# Patient Record
Sex: FEMALE | Race: WHITE | NOT HISPANIC OR LATINO | Employment: OTHER | URBAN - METROPOLITAN AREA
[De-identification: names, ages, dates, MRNs, and addresses within clinical notes are randomized per-mention and may not be internally consistent; named-entity substitution may affect disease eponyms.]

---

## 2024-08-19 ENCOUNTER — APPOINTMENT (OUTPATIENT)
Dept: RADIOLOGY | Facility: HOSPITAL | Age: 59
End: 2024-08-19
Payer: MEDICARE

## 2024-08-19 ENCOUNTER — HOSPITAL ENCOUNTER (EMERGENCY)
Facility: HOSPITAL | Age: 59
Discharge: HOME | End: 2024-08-19
Payer: MEDICARE

## 2024-08-19 VITALS
WEIGHT: 89 LBS | OXYGEN SATURATION: 97 % | TEMPERATURE: 97.7 F | HEIGHT: 61 IN | SYSTOLIC BLOOD PRESSURE: 134 MMHG | RESPIRATION RATE: 18 BRPM | BODY MASS INDEX: 16.8 KG/M2 | HEART RATE: 74 BPM | DIASTOLIC BLOOD PRESSURE: 70 MMHG

## 2024-08-19 DIAGNOSIS — S89.92XA INJURY OF KNEE, LEFT, INITIAL ENCOUNTER: ICD-10-CM
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
## 2024-08-19 DIAGNOSIS — S89.91XA INJURY OF RIGHT KNEE, INITIAL ENCOUNTER: ICD-10-CM

## 2024-08-19 PROCEDURE — 90471 IMMUNIZATION ADMIN: CPT

## 2024-08-19 PROCEDURE — 99283 EMERGENCY DEPT VISIT LOW MDM: CPT | Mod: 25

## 2024-08-19 PROCEDURE — 73564 X-RAY EXAM KNEE 4 OR MORE: CPT | Mod: BILATERAL PROCEDURE | Performed by: RADIOLOGY

## 2024-08-19 PROCEDURE — 90715 TDAP VACCINE 7 YRS/> IM: CPT

## 2024-08-19 PROCEDURE — 96372 THER/PROPH/DIAG INJ SC/IM: CPT

## 2024-08-19 PROCEDURE — 73564 X-RAY EXAM KNEE 4 OR MORE: CPT | Mod: 50

## 2024-08-19 PROCEDURE — 2500000004 HC RX 250 GENERAL PHARMACY W/ HCPCS (ALT 636 FOR OP/ED)

## 2024-08-19 RX ORDER — NAPROXEN 500 MG/1
500 TABLET ORAL
Qty: 20 TABLET | Refills: 0 | Status: SHIPPED | OUTPATIENT
Start: 2024-08-19 | End: 2024-08-29

## 2024-08-19 RX ORDER — NAPROXEN 500 MG/1
500 TABLET ORAL
Qty: 20 TABLET | Refills: 0 | Status: SHIPPED | OUTPATIENT
Start: 2024-08-19 | End: 2024-08-19

## 2024-08-19 RX ORDER — KETOROLAC TROMETHAMINE 30 MG/ML
15 INJECTION, SOLUTION INTRAMUSCULAR; INTRAVENOUS ONCE
Status: COMPLETED | OUTPATIENT
Start: 2024-08-19 | End: 2024-08-19

## 2024-08-19 ASSESSMENT — PAIN - FUNCTIONAL ASSESSMENT: PAIN_FUNCTIONAL_ASSESSMENT: 0-10

## 2024-08-19 ASSESSMENT — LIFESTYLE VARIABLES
EVER FELT BAD OR GUILTY ABOUT YOUR DRINKING: NO
EVER HAD A DRINK FIRST THING IN THE MORNING TO STEADY YOUR NERVES TO GET RID OF A HANGOVER: NO
HAVE PEOPLE ANNOYED YOU BY CRITICIZING YOUR DRINKING: NO
TOTAL SCORE: 0
HAVE YOU EVER FELT YOU SHOULD CUT DOWN ON YOUR DRINKING: NO

## 2024-08-19 ASSESSMENT — COLUMBIA-SUICIDE SEVERITY RATING SCALE - C-SSRS
6. HAVE YOU EVER DONE ANYTHING, STARTED TO DO ANYTHING, OR PREPARED TO DO ANYTHING TO END YOUR LIFE?: NO
2. HAVE YOU ACTUALLY HAD ANY THOUGHTS OF KILLING YOURSELF?: NO
1. IN THE PAST MONTH, HAVE YOU WISHED YOU WERE DEAD OR WISHED YOU COULD GO TO SLEEP AND NOT WAKE UP?: NO

## 2024-08-19 ASSESSMENT — PAIN SCALES - GENERAL: PAINLEVEL_OUTOF10: 5 - MODERATE PAIN

## 2024-08-19 NOTE — DISCHARGE INSTRUCTIONS
You are seen emergency room today for a bilateral knee injury.  X-rays were performed did not show any broken bones.  At this time, you will be discharged home and recommended supportive care for your injuries including rest, ice/heat, Tylenol/ibuprofen.  I did provide you with a referral to orthopedic surgery for further evaluation.  In some cases, you may require an MRI for further evaluation of the injury.  Unfortunately, x-ray imaging does not show us any ligament or tendon injury, but an MRI can.  MRIs are not performed emergently in the emergency department for your specific injury.  Please also follow with your primary care provider regarding her visit to the ED today.  Reasons to return include worsening knee pain, difficulty with ambulation, chest pain, and shortness of breath.

## 2024-08-19 NOTE — ED PROVIDER NOTES
HPI   Chief Complaint   Patient presents with    Fall       Jen is a 58-year-old female with no pertinent past medical history presenting the emergency department with bilateral knee pain.  Patient states that on Friday, 8/16/2024, she went to [facility] [place] where she was walking and tripped over a curb.  Patient states that she landed on her knees bilaterally.  Since the incident, patient has been able to ambulate, but the pain has become progressively worse over the past couple days.  She denies any prior injury or fracture to the knees.  No history of knee replacement.  Patient states that the left knee is more painful than the right.  Patient denies any numbness, tingling, weakness.  No fever or chills.  Patient does have abrasions to the bilateral knees and is unsure when her last tetanus was.               Patient History   No past medical history on file.  No past surgical history on file.  No family history on file.  Social History     Tobacco Use    Smoking status: Not on file    Smokeless tobacco: Not on file   Substance Use Topics    Alcohol use: Not on file    Drug use: Not on file       Physical Exam   ED Triage Vitals [08/19/24 1658]   Temperature Heart Rate Respirations BP   36.5 °C (97.7 °F) 74 18 134/70      Pulse Ox Temp src Heart Rate Source Patient Position   97 % -- -- --      BP Location FiO2 (%)     -- --       Physical Exam  HENT:      Mouth/Throat:      Mouth: Mucous membranes are moist.      Pharynx: Oropharynx is clear.   Eyes:      Extraocular Movements: Extraocular movements intact.   Cardiovascular:      Rate and Rhythm: Normal rate and regular rhythm.      Pulses: Normal pulses.      Heart sounds: Normal heart sounds.   Pulmonary:      Effort: Pulmonary effort is normal.      Breath sounds: Normal breath sounds.   Abdominal:      General: Abdomen is flat.      Palpations: Abdomen is soft.   Musculoskeletal:         General: Tenderness and signs of injury present. No swelling or  deformity.      Cervical back: Normal range of motion. No rigidity.      Comments: Patient with scabbed abrasions to the anterior knees bilaterally.  No obvious soft tissue swelling or deformity.  Tenderness palpation of the bilateral knees with the left worse than the right.  No tenderness palpation of the proximal tib/fib, thigh, or ankles.  Patient has intact passive and active range of motion.  Intact strength and sensation with pulses strong distally.    Neurological:      Mental Status: She is alert and oriented to person, place, and time.           ED Course & MDM   ED Course as of 08/20/24 0136   Mon Aug 19, 2024   1901 XR knee 4+ views bilateral  IMPRESSION:  No acute bony abnormality demonstrated..   [AH]      ED Course User Index  [AH] Michelle Kowalski PA-C         Diagnoses as of 08/20/24 0136   Fall, initial encounter   Injury of knee, left, initial encounter   Injury of right knee, initial encounter                 No data recorded     Abdullahi Coma Scale Score: 15 (08/19/24 1658 : Immanuel Mcdonnell, EMT)                           Medical Decision Making  Jen is a 58-year-old female with no pertinent past medical history presenting the emergency department with bilateral knee pain.     Medical Decision Making: She did not appear ill or toxic.  Vital signs reviewed and stable.  Workup included x-ray of the bilateral knees.  There is no acute bony abnormality demonstrated on the bilateral x-rays.  In the emergency room, patient received IM Toradol for pain control and was up-to-date on her tetanus for the abrasion sustained from the fall.  Patient was able to ambulate.  At this time, she be discharged home and recommended follow with a primary care provider.  I did provide her with referral to orthopedic surgery for further management of her injuries and potential MRI to rule out ligamentous/tendinous injury.  Patient to return the emergency room if develops any worsening pain, difficulty with  ambulation, complete numbness/tingling.  She was agreeable to plan and all question concerns were addressed.     Differential diagnoses considered: Fracture, dislocation, ligamentous tendinous injury, etc.     Medications given: Toradol, Boostrix      Diagnosis: Fall, injury of right and left knee    Plan: Discharge          Procedure  Procedures     Michelle Kowalski PA-C  08/23/24 9448

## 2025-03-23 NOTE — PROGRESS NOTES
History of Present Complaint:  The patient was referred to us by Referring Provider: ***. this is 59 y.o.  female {Accompanied by:[id number]}with a past history of {kt past medical history:90142} presenting with {kt mrdica symptoms:63109}    Pain started {pain onset:39411}  Pain is {Better or worse:52062}   Patient history significant for the following red flags: {Red flags:56658}  The pain is described as {Pain description:42332} and is relieved by {pain relief:00983}      Prior Pain Therapies: {Prior pain therapy:23364}    Past surgical history:  {Past surgical history:70784}           Procedures:   *** the patient has had a ***% improvement in pain.    Portions of record reviewed for pertinent issues: active problem list, medication list, allergies, family history, social history, notes from last encounter, encounters, lab results, imaging and other available records.    I have personally reviewed the OARRS report for this patient. This report is scanned into the electronic medical record. I have considered the risks of abuse, dependence, addiction and diversion. It showed: ***  OPIOID RISK ASSESSMENT SCORE ***/26  Opioid agreement: ***  Activities of daily living: ***  Adverse effects: ***  Analgesia: W/O ***/10, W ***/10  {***}  Toxicology screen: ***  Aberrant behavior: ***  My patient has no underlying substance abuse or alcohol abuse and there's no mental health conditions contributing to the patient's pain.        Diagnostic studies:  ***      Employment/disability/litigation: {ktlitigation:45267}    Social History:  {KT social history:11968}       Review of Systems       Physical Exam       Assessment  ***           Plan  At least 50% of the visit was involved in the discussion of the options for treatment. We discussed exercises, medication, interventional therapies and surgery. Healthy life style is essential with patient hard work to achieve the wellness. In addition; discussion with the patient and/or  family about any of the diagnostic results, impressions and/or recommended diagnostic studies, prognosis, risks and benefits of treatment options, instructions for treatment and/or follow-up, importance of compliance with chosen treatment options, risk-factor reduction, and patient/family education.           Recommended Pool therapy, walking in the pool, at least 3x per week for 30 minutes  Recommend self-directed physical therapy with at least daily exercises for minimum of 20-minute, brochure was handed to the patient  *** Smoking cessation  Healthy lifestyle and anti-inflammatory diet in addition to weight control discussed with the patient  Alternative chronic pain therapies was discussed, encouraged and information was handed  Return to Clinic 3 months       *Please note this report has been produced using speech recognition software and may contain errors related to that system including grammar, punctuation and spelling as well as words and phrases that may be inappropriate. If there are questions or concerns, please feel free to contact me to clarify.    Ruiz Griffin MD

## 2025-03-25 ENCOUNTER — APPOINTMENT (OUTPATIENT)
Dept: PAIN MEDICINE | Facility: CLINIC | Age: 60
End: 2025-03-25
Payer: MEDICARE

## 2025-03-26 NOTE — PROGRESS NOTES
No referral in the system and the associated diagnosis is lower back pain the OARRS showed hydrocodone 7.5 mg tab 120 at the time in 2023 from [facility]     History of Present Complaint:  The patient was referred to us by Referring Provider: ***. this is 59 y.o.  female {Accompanied by:[id number]}with a past history of morbid obesity BMI 40, arthritis, hypothyroidism, fibromyalgia who was on a chronic opioid therapy till 2023 presenting with {kt mrdica symptoms:80984}    Pain started {pain onset:76023}  Pain is {Better or worse:78755}   Patient history significant for the following red flags: {Red flags:98088}  The pain is described as {Pain description:84845} and is relieved by {pain relief:50033}      Prior Pain Therapies: {Prior pain therapy:01039}    Past surgical history:  {Past surgical history:80134}     7/3/2023 Ruthann Dutton MD pain management note:  07/03/2023 56 y/o female referred by self. with PMH of arthritis, fibromyalgia & thyroid problems Pt c/o upper & lower back pain described as sharp, shooting, burning, aching, tearing & throbbing. Pain rated 8 out of 10 with diminished quality of life effecting daily life activities. Pain started 6/29/2023 and is aggravated by bending over & standing up      Will send in tramadol 50 mg for pain      Will send in Baclofen 10mg Q8 prn for 30 days for persisting muscle spasms and tension.      Discussed with patient chronic nature of disease and thus long term management of intractable chronic pain. Management must include healthy diet, improved muscle tone, appropriate mechanics, interventional procedures when indicated, and judicious use of medications. Pt expressed understanding and agrees to be a participant in management of patient's intractable pain. Opiate agreement reviewed again and updated. Pt underwent opiate counseling: Pt will take medications only as prescribed. Pt informed to secure drugs in locked safe. Patient is responsible to prevent children or  anyone other than patient from taking medications. Pt will need to report lost or stolen medications to local law enforcement. I counseled patient regarding the addictive nature of pain medications. Patient is not to take this while driving or working. Patient also has signed the narcotic agreement with me as well. We will do random drug test as medically necessary. Pt underwent opiate counseling: informed or risks of narcotics which include but not limited to sedation, respiratory depression, hypoxia, hypercarbia, addiction, dependence, constipation, itching, nausea, tolerance, withdrawal, death, allergic reaction, impaired judgement and motor skills. Pt expressed understanding the potential danger of the opiates. DPS screen performed and there were no suspicious activities on refills.      Lumbar MRI reports modic changes at L2.      Patient reports today pain is mostly in the low back, some pain will spread to the thoracic region as well. Patient states she had RFA's done in her cervical which did not last long, she also had other injections in the same area. Patient      Recommend yoga gentle exercise for low back pain. Recent studies have shown yoga to be as effective as physical therapy in improving an individuals physical function and pain reduction. Patient given list of suggestions for facilities offering yoga. Encouraged patient to attend to [facility] on youtube.    Advised pt on anti-inflammatory supplements, such as Curcumin      Patient will schedule 3 month f/u         Procedures:   *** the patient has had a ***% improvement in pain.    Portions of record reviewed for pertinent issues: active problem list, medication list, allergies, family history, social history, notes from last encounter, encounters, lab results, imaging and other available records.    I have personally reviewed the OARRS report for this patient. This report is scanned into the electronic medical record. I have considered the  risks of abuse, dependence, addiction and diversion. It showed: hydrocodone 7.5 mg tab 120 at the time in 2023 from [facility]   OPIOID RISK ASSESSMENT SCORE ***/26  Opioid agreement: ***  Activities of daily living: ***  Adverse effects: ***  Analgesia: W/O ***/10, W ***/10  {***}  Toxicology screen: ***  Aberrant behavior: ***  My patient has no underlying substance abuse or alcohol abuse and there's no mental health conditions contributing to the patient's pain.        Diagnostic studies:  ***      Employment/disability/litigation: {ktlitigation:16244}    Social History:  { social history:05084}       Review of Systems       Physical Exam       Assessment  ***           Plan  At least 50% of the visit was involved in the discussion of the options for treatment. We discussed exercises, medication, interventional therapies and surgery. Healthy life style is essential with patient hard work to achieve the wellness. In addition; discussion with the patient and/or family about any of the diagnostic results, impressions and/or recommended diagnostic studies, prognosis, risks and benefits of treatment options, instructions for treatment and/or follow-up, importance of compliance with chosen treatment options, risk-factor reduction, and patient/family education.           Recommended Pool therapy, walking in the pool, at least 3x per week for 30 minutes  Recommend self-directed physical therapy with at least daily exercises for minimum of 20-minute, brochure was handed to the patient  *** Smoking cessation  Healthy lifestyle and anti-inflammatory diet in addition to weight control discussed with the patient  Alternative chronic pain therapies was discussed, encouraged and information was handed  Return to Clinic 3 months       *Please note this report has been produced using speech recognition software and may contain errors related to that system including grammar, punctuation and spelling as well as words and  phrases that may be inappropriate. If there are questions or concerns, please feel free to contact me to clarify.    Ruiz Griffin MD

## 2025-04-02 ENCOUNTER — APPOINTMENT (OUTPATIENT)
Dept: PAIN MEDICINE | Facility: CLINIC | Age: 60
End: 2025-04-02
Payer: MEDICARE